# Patient Record
Sex: MALE | Race: WHITE | Employment: STUDENT | URBAN - NONMETROPOLITAN AREA
[De-identification: names, ages, dates, MRNs, and addresses within clinical notes are randomized per-mention and may not be internally consistent; named-entity substitution may affect disease eponyms.]

---
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
## 2025-01-08 ENCOUNTER — OFFICE VISIT (OUTPATIENT)
Dept: FAMILY MEDICINE CLINIC | Age: 20
End: 2025-01-08
Payer: COMMERCIAL

## 2025-01-08 VITALS
HEIGHT: 70 IN | SYSTOLIC BLOOD PRESSURE: 106 MMHG | RESPIRATION RATE: 16 BRPM | HEART RATE: 64 BPM | BODY MASS INDEX: 18.58 KG/M2 | WEIGHT: 129.8 LBS | DIASTOLIC BLOOD PRESSURE: 70 MMHG | TEMPERATURE: 97.7 F | OXYGEN SATURATION: 97 %

## 2025-01-08 DIAGNOSIS — Z76.89 ENCOUNTER TO ESTABLISH CARE: Primary | ICD-10-CM

## 2025-01-08 DIAGNOSIS — R41.840 CONCENTRATION DEFICIT: ICD-10-CM

## 2025-01-08 DIAGNOSIS — K59.1 FUNCTIONAL DIARRHEA: ICD-10-CM

## 2025-01-08 PROCEDURE — 99203 OFFICE O/P NEW LOW 30 MIN: CPT | Performed by: FAMILY MEDICINE

## 2025-01-08 SDOH — ECONOMIC STABILITY: FOOD INSECURITY: WITHIN THE PAST 12 MONTHS, YOU WORRIED THAT YOUR FOOD WOULD RUN OUT BEFORE YOU GOT MONEY TO BUY MORE.: NEVER TRUE

## 2025-01-08 SDOH — ECONOMIC STABILITY: FOOD INSECURITY: WITHIN THE PAST 12 MONTHS, THE FOOD YOU BOUGHT JUST DIDN'T LAST AND YOU DIDN'T HAVE MONEY TO GET MORE.: NEVER TRUE

## 2025-01-08 ASSESSMENT — ENCOUNTER SYMPTOMS
FACIAL SWELLING: 0
CONSTIPATION: 0
VOMITING: 0
COLOR CHANGE: 0
COUGH: 0
SHORTNESS OF BREATH: 0
APNEA: 0
CHEST TIGHTNESS: 0
PHOTOPHOBIA: 0
BLOOD IN STOOL: 0
DIARRHEA: 0
SINUS PRESSURE: 0
SINUS PAIN: 0
RHINORRHEA: 0
ABDOMINAL DISTENTION: 0

## 2025-01-08 ASSESSMENT — PATIENT HEALTH QUESTIONNAIRE - PHQ9
1. LITTLE INTEREST OR PLEASURE IN DOING THINGS: NOT AT ALL
SUM OF ALL RESPONSES TO PHQ9 QUESTIONS 1 & 2: 0
SUM OF ALL RESPONSES TO PHQ QUESTIONS 1-9: 0
2. FEELING DOWN, DEPRESSED OR HOPELESS: NOT AT ALL

## 2025-01-08 NOTE — PROGRESS NOTES
Ky Chapa is a 19 y.o. male   CC:   Chief Complaint   Patient presents with    New Patient     Est care, diarrhea, goes several times in a day and then will go days without going       History of Present Illness  The patient is a 19-year-old male who presents for evaluation of diarrhea and suspected ADHD. He is accompanied by his mother.    Diarrhea  He reports experiencing inconsistent bowel movements, predominantly characterized by diarrhea. He has approximately 2 to 3 episodes of diarrhea daily, with the consistency varying between soft and watery stools. Despite these symptoms, he manages to reach the bathroom in time. He has not had any accidents or presence of skid marks in the past year. He has been experiencing these symptoms for at least 5 years. He has attempted dietary modifications, including the elimination of fast food for the past 1 to 2 months, but these changes have not resulted in any improvement. His diet primarily consists of home-cooked meals, including chicken, red meat, potatoes, and rice. He describes himself as a selective eater, with preferences influenced by taste and texture. He has not yet tried eliminating dairy from his diet, although he suspects lactose intolerance. He typically needs to use the bathroom within 2 hours of eating a meal.  - Onset: At least 5 years ago.  - Location: Gastrointestinal tract.  - Duration: Persistent for at least 5 years.  - Character: Inconsistent bowel movements, predominantly diarrhea, with 2 to 3 episodes daily; consistency varies between soft and watery stools.  - Alleviating/Aggravating Factors: Dietary modifications, including elimination of fast food, have not resulted in improvement; suspects lactose intolerance but has not eliminated dairy.  - Timing: Typically needs to use the bathroom within 2 hours of eating a meal.  - Severity: Manages to reach the bathroom in time; no accidents or skid marks in the past year.    Suspected